# Patient Record
Sex: MALE | Race: WHITE
[De-identification: names, ages, dates, MRNs, and addresses within clinical notes are randomized per-mention and may not be internally consistent; named-entity substitution may affect disease eponyms.]

---

## 2019-06-14 ENCOUNTER — HOSPITAL ENCOUNTER (EMERGENCY)
Dept: HOSPITAL 97 - ER | Age: 34
Discharge: HOME | End: 2019-06-14
Payer: COMMERCIAL
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
## 2019-06-14 DIAGNOSIS — Z23: ICD-10-CM

## 2019-06-14 DIAGNOSIS — S01.511A: Primary | ICD-10-CM

## 2019-06-14 DIAGNOSIS — Z88.0: ICD-10-CM
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
## 2019-06-14 PROCEDURE — 99283 EMERGENCY DEPT VISIT LOW MDM: CPT

## 2019-06-14 PROCEDURE — 90471 IMMUNIZATION ADMIN: CPT

## 2019-06-14 PROCEDURE — 90714 TD VACC NO PRESV 7 YRS+ IM: CPT

## 2019-06-14 NOTE — ER
Nurse's Notes                                                                                     

 [facility]                                                                 

Name: Julio Pichardo III                                                                             

Age: 33 yrs                                                                                       

Sex: Male                                                                                         

: 1985                                                                                   

MRN: [id number]                                                                                   

Arrival Date: 2019                                                                          

Time: 18:37                                                                                       

Account#: [id number]                                                                            

Bed 28                                                                                            

Private MD:                                                                                       

Diagnosis: Contusion of lip and oral cavity;Laceration without foreign body of lip-inner aspect   

                                                                                                  

Presentation:                                                                                     

                                                                                             

18:43 Presenting complaint: Patient states: "I was helping my dad put up a fence about an     aa5 

      hour ago and I took a hit to my upper lip with a metal pole". Denies LOC. Transition of     

      care: patient was not received from another setting of care. Onset of symptoms was 2019. Risk Assessment: Do you want to hurt yourself or someone else? Patient            

      reports no desire to harm self or others. Initial Sepsis Screen: Does the patient meet      

      any 2 criteria? No. Patient's initial sepsis screen is negative. Does the patient have      

      a suspected source of infection? No. Patient's initial sepsis screen is negative. Care      

      prior to arrival: None.                                                                     

18:43 Method Of Arrival: Ambulatory                                                           aa5 

18:43 Acuity: MULU 4                                                                           aa5 

                                                                                                  

Historical:                                                                                       

- Allergies:                                                                                      

18:44 PENICILLINS;                                                                            aa5 

- PMHx:                                                                                           

18:44 None;                                                                                   aa5 

- PSHx:                                                                                           

18:44 None;                                                                                   aa5 

                                                                                                  

- Immunization history:: Last tetanus immunization: unknown.                                      

- Social history:: Smoking status: Patient/guardian denies using tobacco.                         

- Ebola Screening: : No symptoms or risks identified at this time.                                

                                                                                                  

                                                                                                  

Screenin:03 Abuse screen: Denies threats or abuse. Denies injuries from another.                    mg2 

19:05 Nutritional screening: No deficits noted. Tuberculosis screening: No symptoms or risk   mg2 

      factors identified. Fall Risk None identified.                                              

                                                                                                  

Assessment:                                                                                       

19:05 General: Appears in no apparent distress. comfortable, Behavior is calm, cooperative.   mg2 

      Pain: Complains of pain in mouth Pain does not radiate. Pain currently is 4 out of 10       

      on a pain scale. Quality of pain is described as aching, Pain began 2 hours ago. Is         

      intermittent. Neuro: Level of Consciousness is awake, alert, obeys commands, Oriented       

      to person, place, time, situation. Cardiovascular: Capillary refill < 3 seconds             

      Patient's skin is warm and dry. Respiratory: Airway is patent Respiratory effort is         

      even, unlabored, Respiratory pattern is regular, symmetrical. GI: No signs and/or           

      symptoms were reported involving the gastrointestinal system. : No signs and/or           

      symptoms were reported regarding the genitourinary system. EENT: upper lip trauma.          

      Derm: Skin is intact, is healthy with good turgor, Skin is pink, warm \T\ dry. normal.      

      Musculoskeletal: Circulation, motion, and sensation intact. Capillary refill < 3            

      seconds. Injury Description: pain and swelling.                                             

19:40 Reassessment: Patient appears in no apparent distress at this time. Patient is alert,   ca1 

      oriented x 3, equal unlabored respirations, skin warm/dry/pink.                             

                                                                                                  

Vital Signs:                                                                                      

18:44  / 100; Pulse 93; Resp 16 S; Temp 98.2(TE); Pulse Ox 99% on R/A; Weight 99.79 kg  aa5 

      (R); Height 6 ft. 2 in. (187.96 cm) (R); Pain 5/10;                                         

19:40  / 97; Pulse 97; Resp 16 S; Temp 98(O); Pulse Ox 98% on R/A;                      ca1 

18:44 Body Mass Index 28.25 (99.79 kg, 187.96 cm)                                             aa5 

                                                                                                  

ED Course:                                                                                        

18:37 Patient arrived in ED.                                                                  mr  

18:44 Triage completed.                                                                       aa5 

18:44 Arm band placed on.                                                                     aa5 

18:55 [facility]                                                        snw 

18:55 Rizwan Singh MD is Attending Physician.                                              snw 

19:01 Isaac Lacy RN is Primary Nurse.                                                  mg2 

19:09 Patient has correct armband on for positive identification.                             mg2 

19:09 No provider procedures requiring assistance completed. Patient did not have IV access   mg2 

      during this emergency room visit.                                                           

                                                                                                  

Administered Medications:                                                                         

19:24 Drug: Tetanus-Diphtheria Toxoid Adult 0.5 ml {: [employer]. Exp:         ca1 

      2021. Lot #: [id number]. } Route: IM; Site: left deltoid;                                  

19:24 Drug: Clindamycin 300 mg Route: PO;                                                     ca1 

                                                                                                  

                                                                                                  

Outcome:                                                                                          

19:14 Discharge ordered by MD.                                                                snw 

19:44 Discharged to home ambulatory.                                                          ca1 

19:44 Condition: stable                                                                           

19:44 Discharge instructions given to patient, Instructed on discharge instructions, follow       

      up and referral plans. Demonstrated understanding of instructions, follow-up care,          

      medications, Prescriptions given X 3.                                                       

19:45 Patient left the ED.                                                                    ca1 

                                                                                                  

Signatures:                                                                                       

Yocasta Shannon, JONI-C                 FNP-Magda Siegel                                                   

LuisVane, RN                     RN   aa5                                                  

Isaac Lacy RN                    RN   mg2                                                  

Kandy Burch RN                        RN   ca1                                                  

                                                                                                  

**************************************************************************************************

## 2019-06-14 NOTE — EDPHYS
Physician Documentation                                                                           

 [facility]                                                                 

Name: Julio Pichardo III                                                                             

Age: 33 yrs                                                                                       

Sex: Male                                                                                         

: 1985                                                                                   

MRN: [id number]                                                                                   

Arrival Date: 2019                                                                          

Time: 18:37                                                                                       

Account#: [id number]                                                                            

Bed 28                                                                                            

Private MD:                                                                                       

ED Physician Rizwan Singh                                                                       

HPI:                                                                                              

                                                                                             

19:35 This 33 yrs old  Male presents to ER via Ambulatory with complaints of Lips    snw 

      Swelling.                                                                                   

19:35 The patient presents with swelling. The problem is located in the upper vermilion       snw 

      border and mouth. Onset: The symptoms/episode began/occurred suddenly, just prior to        

      arrival. Duration: The symptoms are continuous. Associated signs and symptoms: The          

      patient has no apparent associated signs or symptoms. Severity of symptoms: At their        

      worst the symptoms were mild. The patient has not experienced similar symptoms in the       

      past. The patient has not recently seen a physician. Pt states he was helping with a        

      fence and a metal pole slipped and struck him in the left upper lip.                        

                                                                                                  

Historical:                                                                                       

- Allergies:                                                                                      

18:44 PENICILLINS;                                                                            aa5 

- PMHx:                                                                                           

18:44 None;                                                                                   aa5 

- PSHx:                                                                                           

18:44 None;                                                                                   aa5 

                                                                                                  

- Immunization history:: Last tetanus immunization: unknown.                                      

- Social history:: Smoking status: Patient/guardian denies using tobacco.                         

- Ebola Screening: : No symptoms or risks identified at this time.                                

                                                                                                  

                                                                                                  

ROS:                                                                                              

19:35 Constitutional: Negative for fever, chills, and weight loss, Eyes: Negative for injury, snw 

      pain, redness, and discharge, Neck: Negative for injury, pain, and swelling,                

      Cardiovascular: Negative for chest pain, palpitations, and edema, Respiratory: Negative     

      for shortness of breath, cough, wheezing, and pleuritic chest pain, Abdomen/GI:             

      Negative for abdominal pain, nausea, vomiting, diarrhea, and constipation, Back:            

      Negative for injury and pain, : Negative for injury, bleeding, discharge, and             

      swelling, MS/Extremity: Negative for injury and deformity, Skin: Negative for injury,       

      rash, and discoloration, Neuro: Negative for headache, weakness, numbness, tingling,        

      and seizure.                                                                                

19:35 ENT: Positive for injury or acute deformity, abrasion, laceration.                          

                                                                                                  

Exam:                                                                                             

19:27 Constitutional:  This is a well developed, well nourished patient who is awake, alert,  snw 

      and in no acute distress. Eyes:  Pupils equal round and reactive to light, extra-ocular     

      motions intact.  Lids and lashes normal.  Conjunctiva and sclera are non-icteric and        

      not injected.  Cornea within normal limits.  Periorbital areas with no swelling,            

      redness, or edema. ENT:  Nares patent. No nasal discharge, no septal abnormalities          

      noted.  Tympanic membranes are normal and external auditory canals are clear.               

      Oropharynx with no redness, swelling, or masses, exudates, or evidence of obstruction,      

      uvula midline.  Mucous membranes moist. Neck:  Trachea midline, no thyromegaly or           

      masses palpated, and no cervical lymphadenopathy.  Supple, full range of motion without     

      nuchal rigidity, or vertebral point tenderness.  No Meningismus. Chest/axilla:  Normal      

      chest wall appearance and motion.  Nontender with no deformity.  No lesions are             

      appreciated. Cardiovascular:  Regular rate and rhythm with a normal S1 and S2.  No          

      gallops, murmurs, or rubs.  Normal PMI, no JVD.  No pulse deficits. Respiratory:  Lungs     

      have equal breath sounds bilaterally, clear to auscultation and percussion.  No rales,      

      rhonchi or wheezes noted.  No increased work of breathing, no retractions or nasal          

      flaring. Abdomen/GI:  Soft, non-tender, with normal bowel sounds.  No distension or         

      tympany.  No guarding or rebound.  No evidence of tenderness throughout. Back:  No          

      spinal tenderness.  No costovertebral tenderness.  Full range of motion. Skin:  Warm,       

      dry with normal turgor.  Normal color with no rashes, no lesions, and no evidence of        

      cellulitis. MS/ Extremity:  Pulses equal, no cyanosis.  Neurovascular intact.  Full,        

      normal range of motion. Neuro:  Awake and alert, GCS 15, oriented to person, place,         

      time, and situation.  Cranial nerves II-XII grossly intact.  Motor strength 5/5 in all      

      extremities.  Sensory grossly intact.  Cerebellar exam normal.  Normal gait. Psych:         

      Awake, alert, with orientation to person, place and time.  Behavior, mood, and affect       

      are within normal limits.                                                                   

19:27 Head/face: Noted is abrasion(s), that are moderate, of the  upper vermilion border,         

      contusion, that is deep, of the  upper vermilion border, a laceration(s), that is           

      linear, 1 cm(s), of the  mouth- inner aspect or left inside upper lip.                      

                                                                                                  

Vital Signs:                                                                                      

18:44  / 100; Pulse 93; Resp 16 S; Temp 98.2(TE); Pulse Ox 99% on R/A; Weight 99.79 kg  aa5 

      (R); Height 6 ft. 2 in. (187.96 cm) (R); Pain 5/10;                                         

19:40  / 97; Pulse 97; Resp 16 S; Temp 98(O); Pulse Ox 98% on R/A;                      ca1 

18:44 Body Mass Index 28.25 (99.79 kg, 187.96 cm)                                             aa5 

                                                                                                  

MDM:                                                                                              

18:55 Patient medically screened.                                                             snw 

19:33 Data reviewed: vital signs, nurses notes. Data interpreted: Pulse oximetry: on room air snw 

      is 99 %. Interpretation: normal. Counseling: I had a detailed discussion with the           

      patient and/or guardian regarding: the historical points, exam findings, and any            

      diagnostic results supporting the discharge/admit diagnosis, the need for outpatient        

      follow up, to return to the emergency department if symptoms worsen or persist or if        

      there are any questions or concerns that arise at home. Special discussion: I have          

      referred the patient to see his PCP for further evaluation of high blood pressure.          

      Based on the history and exam findings, there is no indication for further emergent         

      testing or inpatient evaluation. I discussed with the patient/guardian the need to see      

      the primary care provider for further evaluation of the symptoms.                           

                                                                                                  

Administered Medications:                                                                         

19:24 Drug: Tetanus-Diphtheria Toxoid Adult 0.5 ml {: [employer]. Exp:         ca1 

      2021. Lot #: [id number]. } Route: IM; Site: left deltoid;                                  

19:24 Drug: Clindamycin 300 mg Route: PO;                                                     ca1 

                                                                                                  

                                                                                                  

Disposition:                                                                                      

19 19:14 Discharged to Home. Impression: Contusion of lip and oral cavity, Laceration       

  without foreign body of lip - inner aspect.                                                     

- Condition is Stable.                                                                            

- Discharge Instructions: Abrasion, Hypertension, Nonsutured Laceration Care.                     

- Prescriptions for chlorhexidine gluconate 0.12 % Mucous Membrane mouthwash - place 15           

  milliliter by MUCOUS MEMBRANE route 2 times per day after brushing teeth, swish in              

  mouth for 30 seconds then spit out; 480 milliliter. Clindamycin HCl 300 mg Oral                 

  Capsule - take 1 capsule by ORAL route every 8 hours for 7 days; 21 capsule. Tylenol-           

  Codeine #3 300-30 mg Oral Tablet - take 2 tablets by ORAL route every 6 hours As                

  needed; 14 tablet.                                                                              

- Medication Reconciliation Form, Thank You Letter, Antibiotic Education, Prescription            

  Opioid Use form.                                                                                

- Follow up: Private Physician; When: 1 week; Reason: Recheck today's complaints,                 

  Continuance of care, Re-evaluation by your physician. Follow up: Emergency                      

  Department; When: As needed; Reason: Worsening of condition.                                    

                                                                                                  

                                                                                                  

                                                                                                  

Addendum:                                                                                         

2019                                                                                        

     11:53 Co-signature as Attending Physician, Rizwan Singh MD I agree with the assessment and   k
dr

           plan of care.                                                                          

                                                                                                  

Signatures:                                                                                       

Rizwan Singh MD MD   [facility]                                                  

Yocasta Shannon, CJP-C                 FNP-Csnw                                                  

Vane Smith, RN                     RN   aa5                                                  

AcKandy tipton RN                        RN   ca1                                                  

                                                                                                  

Corrections: (The following items were deleted from the chart)                                    

                                                                                             

19:45 19:14 2019 19:14 Discharged to Home. Impression: Contusion of lip and oral        ca1 

      cavity; Laceration without foreign body of lip - inner aspect. Condition is Stable.         

      Forms are Medication Reconciliation Form, Thank You Letter, Antibiotic Education,           

      Prescription Opioid Use. Follow up: Private Physician; When: 1 week; Reason: Recheck        

      today's complaints, Continuance of care, Re-evaluation by your physician. Follow up:        

      Emergency Department; When: As needed; Reason: Worsening of condition. snw                  

                                                                                                  

**************************************************************************************************